# Patient Record
Sex: MALE | Race: BLACK OR AFRICAN AMERICAN | NOT HISPANIC OR LATINO | Employment: UNEMPLOYED | URBAN - METROPOLITAN AREA
[De-identification: names, ages, dates, MRNs, and addresses within clinical notes are randomized per-mention and may not be internally consistent; named-entity substitution may affect disease eponyms.]

---

## 2018-01-10 NOTE — MISCELLANEOUS
Provider Comments  Provider Comments:   Patient did not show for his 10:30am new pt appt today        Signatures   Electronically signed by : VANESSA Lentz; Feb 17 2016 12:14PM EST                       (Author)    Electronically signed by : GUMARO Foss ; Feb 17 2016  4:17PM EST

## 2018-02-09 ENCOUNTER — HOSPITAL ENCOUNTER (EMERGENCY)
Facility: HOSPITAL | Age: 23
Discharge: HOME | End: 2018-02-09
Attending: EMERGENCY MEDICINE | Admitting: EMERGENCY MEDICINE

## 2018-02-09 VITALS
TEMPERATURE: 98.7 F | RESPIRATION RATE: 14 BRPM | WEIGHT: 150 LBS | SYSTOLIC BLOOD PRESSURE: 103 MMHG | DIASTOLIC BLOOD PRESSURE: 57 MMHG | HEART RATE: 78 BPM | BODY MASS INDEX: 20.32 KG/M2 | OXYGEN SATURATION: 99 % | HEIGHT: 72 IN

## 2018-02-09 DIAGNOSIS — F12.90 MARIJUANA USE: ICD-10-CM

## 2018-02-09 DIAGNOSIS — R40.0 DROWSINESS: Primary | ICD-10-CM

## 2018-02-09 LAB — GLUCOSE SERPL-MCNC: 79 MG/DL (ref 65–140)

## 2018-02-09 PROCEDURE — 93005 ELECTROCARDIOGRAM TRACING: CPT

## 2018-02-09 PROCEDURE — 99285 EMERGENCY DEPT VISIT HI MDM: CPT

## 2018-02-09 PROCEDURE — 82948 REAGENT STRIP/BLOOD GLUCOSE: CPT

## 2018-02-10 LAB
ATRIAL RATE: 76 BPM
P AXIS: 54 DEGREES
PR INTERVAL: 140 MS
QRS AXIS: 98 DEGREES
QRSD INTERVAL: 90 MS
QT INTERVAL: 368 MS
QTC INTERVAL: 414 MS
T WAVE AXIS: 35 DEGREES
VENTRICULAR RATE: 76 BPM

## 2018-02-10 PROCEDURE — 93010 ELECTROCARDIOGRAM REPORT: CPT | Performed by: INTERNAL MEDICINE
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
## 2018-02-10 NOTE — ED PROCEDURE NOTE
PROCEDURE  ECG 12 Lead Documentation  Date/Time: 2/9/2018 10:09 PM  Performed by: Alphonso Sepulveda  Authorized by: Alphonso Sepulveda     Indications / Diagnosis:  Substance abuse  ECG reviewed by me, the ED Provider: yes    Patient location:  ED  Previous ECG:     Previous ECG:  Unavailable  Interpretation:     Interpretation: normal    Rate:     ECG rate:  76    ECG rate assessment: normal    Rhythm:     Rhythm: sinus rhythm    Ectopy:     Ectopy: none    QRS:     QRS axis:  Right  Conduction:     Conduction: normal    ST segments:     ST segments:  Normal  T waves:     T waves: normal           Francesca Mera DO  02/10/18 1423
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
## 2018-02-10 NOTE — ED NOTES
Pt demonstrating some repetitive questioning   Pt avoiding questioning     Vernon Houser RN  02/09/18 0197

## 2018-02-10 NOTE — DISCHARGE INSTRUCTIONS
Cannabis Abuse   WHAT YOU NEED TO KNOW:   Cannabis, also called marijuana, is a drug that comes from the cannabis sativa (hemp plant)  It may also be called pot, weed, or hash  Cannabis can be smoked, baked into food and eaten, or made into a tea you can drink  It can make you feel high, happy, or excited  The effects may start right away and last for 3 to 4 hours depending on whether you smoke or eat cannabis  DISCHARGE INSTRUCTIONS:   Return to the emergency department if:   · The effects of cannabis have worn off, and you have shortness of breath, a fast heart rate, or chest pain  · You want to hurt or kill yourself or others  Contact your healthcare provider if:   · You cannot fight the urge to use cannabis  · You have stopped using cannabis, and feel that you cannot cope with your withdrawal symptoms  · You want help or more information on how to decrease or stop using cannabis  · You have questions or concerns about your condition or care  Signs of cannabis abuse:  Cannabis abuse is a pattern of use that causes physical or mental problems:  · The use of cannabis makes you unable to function at work, school, or in your home  You may be absent often, or your work may be done poorly  You may not be able to take care of your children or your home  · You use cannabis when it is dangerous to be under the effects of the drug  This includes when you drive a vehicle or use machinery  · You have problems with the police when you are under the effects of cannabis  · You keep using cannabis even when you argue with your family and friends about your use  · You need to use more cannabis to give you the high feeling or other effects that you want  You have withdrawal symptoms after you stop using cannabis  How cannabis affects your baby:  Cannabis use may keep your unborn baby from growing as fast as he should  It may harm your unborn baby's eyes and nervous system   When he gets older, he may have difficulty in school and with solving problems  He may also have a poor memory, or problems focusing or paying attention  He may be impulsive (reacting without thinking first)  He may be at an increased risk for depression  He may have a higher risk of smoking cigarettes and using cannabis when he is an adult  Signs of cannabis withdrawal:  Cannabis withdrawal happens when you have used cannabis for a long period of time, and you suddenly take less or stop taking it  Withdrawal symptoms may start on the first day and may last up to 2 weeks  You may have more than one of the following:  · Decreased appetite and weight loss     · Night sweats and trouble sleeping    · Craving for cannabis    · Irritability     · Feeling agitated, anxious, or restless    · Depressed or negative mood  Treatment:   · Brief intervention therapy  is done by meeting with a healthcare provider who will talk to and encourage you  During therapy, you will discuss your cannabis use with the healthcare provider  The healthcare provider will help you understand that you are responsible for making changes in your life  He will explain how you can be helped by decreasing or stopping cannabis use, and give you treatment options  · Cognitive behavioral therapy (CBT)  helps you change your thinking and behavior  It can help you manage depression and anxiety caused by cannabis use  CBT can help you learn good coping skills and ways to manage stress  CBT can be done with you and a talk therapist or in a group with others  · Motivational enhancement therapy  is used to help you set goals to change your behavior and stop cannabis abuse  · Group, marriage, and family therapy  can help you find support and motivation to stop cannabis abuse  Self-help group therapy includes meeting with others who also want to stop using cannabis  Marriage and family therapy can help you by including others to support you in your treatment       · A voucher program  provides vouchers (rewards) for attending therapy or not using cannabis  You may need to provide urine samples for testing  If your urine shows no signs of cannabis use, you may get a voucher  This program may report you to the court, or tell your family or friends if you decide to use cannabis  Follow up with your healthcare provider as directed:  Write down your questions so you remember to ask them during your visits  For more information:   · [employer]  [address] [place] [address], [address], [place] [address]  Phone: 5- 645 - 153-3616  Web Address: www tommie nih gov  © 2017 [facility] Information is for End User's use only and may not be sold, redistributed or otherwise used for commercial purposes  All illustrations and images included in CareNotes® are the copyrighted property of [employer] , [employer]  or Ubaldo Avila  The above information is an  only  It is not intended as medical advice for individual conditions or treatments  Talk to your doctor, nurse or pharmacist before following any medical regimen to see if it is safe and effective for you

## 2024-10-23 ENCOUNTER — OFFICE VISIT (OUTPATIENT)
Age: 29
End: 2024-10-23
Payer: COMMERCIAL

## 2024-10-23 VITALS
HEART RATE: 86 BPM | RESPIRATION RATE: 16 BRPM | BODY MASS INDEX: 28.8 KG/M2 | DIASTOLIC BLOOD PRESSURE: 86 MMHG | WEIGHT: 201.2 LBS | OXYGEN SATURATION: 98 % | HEIGHT: 70 IN | TEMPERATURE: 97.9 F | SYSTOLIC BLOOD PRESSURE: 126 MMHG

## 2024-10-23 DIAGNOSIS — Z13.89 SCREENING FOR GENITOURINARY CONDITION: ICD-10-CM

## 2024-10-23 DIAGNOSIS — Z13.29 SCREENING FOR THYROID DISORDER: ICD-10-CM

## 2024-10-23 DIAGNOSIS — Z76.89 ENCOUNTER TO ESTABLISH CARE WITH NEW DOCTOR: Primary | ICD-10-CM

## 2024-10-23 DIAGNOSIS — R41.840 CONCENTRATION DEFICIT: ICD-10-CM

## 2024-10-23 DIAGNOSIS — Z13.1 SCREENING FOR DIABETES MELLITUS: ICD-10-CM

## 2024-10-23 DIAGNOSIS — Z13.21 ENCOUNTER FOR VITAMIN DEFICIENCY SCREENING: ICD-10-CM

## 2024-10-23 DIAGNOSIS — Z00.00 ANNUAL PHYSICAL EXAM: ICD-10-CM

## 2024-10-23 DIAGNOSIS — Z20.2 STD EXPOSURE: ICD-10-CM

## 2024-10-23 DIAGNOSIS — Z13.220 SCREENING, LIPID: ICD-10-CM

## 2024-10-23 PROCEDURE — 99385 PREV VISIT NEW AGE 18-39: CPT | Performed by: INTERNAL MEDICINE

## 2024-10-23 RX ORDER — LEVOCETIRIZINE DIHYDROCHLORIDE 5 MG/1
5 TABLET, FILM COATED ORAL
COMMUNITY
Start: 2024-10-15 | End: 2024-12-14

## 2024-10-23 RX ORDER — FLUTICASONE PROPIONATE 50 MCG
2 SPRAY, SUSPENSION (ML) NASAL DAILY
COMMUNITY
Start: 2024-10-15 | End: 2024-10-25
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
## 2024-10-24 ENCOUNTER — TELEPHONE (OUTPATIENT)
Age: 29
End: 2024-10-24

## 2024-10-24 NOTE — TELEPHONE ENCOUNTER
Contacted the patient regarding a routine referral to verify service needs and inform pt of the current waitlist. Patient answered phone, writer announced where calling from pt asked if he could call back or if writer could call back as he was on the other line. Writer confirmed a follow-up call would be made accordingly.